# Patient Record
Sex: MALE | Race: OTHER
[De-identification: names, ages, dates, MRNs, and addresses within clinical notes are randomized per-mention and may not be internally consistent; named-entity substitution may affect disease eponyms.]

---

## 2018-08-02 ENCOUNTER — HOSPITAL ENCOUNTER (INPATIENT)
Dept: HOSPITAL 88 - OR | Age: 68
LOS: 5 days | Discharge: HOME | DRG: 665 | End: 2018-08-07
Attending: INTERNAL MEDICINE | Admitting: INTERNAL MEDICINE
Payer: MEDICARE

## 2018-08-02 VITALS — DIASTOLIC BLOOD PRESSURE: 78 MMHG | SYSTOLIC BLOOD PRESSURE: 123 MMHG

## 2018-08-02 VITALS — BODY MASS INDEX: 23.79 KG/M2 | HEIGHT: 66 IN | WEIGHT: 148.04 LBS

## 2018-08-02 VITALS — SYSTOLIC BLOOD PRESSURE: 112 MMHG | DIASTOLIC BLOOD PRESSURE: 80 MMHG

## 2018-08-02 VITALS — SYSTOLIC BLOOD PRESSURE: 135 MMHG | DIASTOLIC BLOOD PRESSURE: 78 MMHG

## 2018-08-02 DIAGNOSIS — C61: ICD-10-CM

## 2018-08-02 DIAGNOSIS — C79.51: ICD-10-CM

## 2018-08-02 DIAGNOSIS — D64.9: ICD-10-CM

## 2018-08-02 DIAGNOSIS — E78.5: ICD-10-CM

## 2018-08-02 DIAGNOSIS — N18.6: ICD-10-CM

## 2018-08-02 DIAGNOSIS — N17.9: ICD-10-CM

## 2018-08-02 DIAGNOSIS — I12.0: Primary | ICD-10-CM

## 2018-08-02 DIAGNOSIS — Z99.2: ICD-10-CM

## 2018-08-02 DIAGNOSIS — N13.30: ICD-10-CM

## 2018-08-02 DIAGNOSIS — E87.2: ICD-10-CM

## 2018-08-02 DIAGNOSIS — M17.10: ICD-10-CM

## 2018-08-02 DIAGNOSIS — R33.9: ICD-10-CM

## 2018-08-02 DIAGNOSIS — R35.1: ICD-10-CM

## 2018-08-02 LAB
ALBUMIN SERPL-MCNC: 3.6 G/DL (ref 3.5–5)
ALBUMIN/GLOB SERPL: 0.8 {RATIO} (ref 0.8–2)
ALP SERPL-CCNC: 168 IU/L (ref 40–150)
ALT SERPL-CCNC: 17 IU/L (ref 0–55)
ANION GAP SERPL CALC-SCNC: 20.9 MMOL/L (ref 8–16)
BASOPHILS # BLD AUTO: 0.1 10*3/UL (ref 0–0.1)
BASOPHILS NFR BLD AUTO: 0.9 % (ref 0–1)
BUN SERPL-MCNC: 65 MG/DL (ref 7–26)
BUN/CREAT SERPL: 13 (ref 6–25)
CALCIUM SERPL-MCNC: 9.6 MG/DL (ref 8.4–10.2)
CHLORIDE SERPL-SCNC: 104 MMOL/L (ref 98–107)
CHOLEST SERPL-MCNC: 207 MD/DL (ref 0–199)
CHOLEST/HDLC SERPL: 4.2 {RATIO} (ref 3.9–4.7)
CO2 SERPL-SCNC: 19 MMOL/L (ref 22–29)
DEPRECATED APTT PLAS QN: 33.7 SECONDS (ref 23.8–35.5)
DEPRECATED INR PLAS: 1.09
DEPRECATED NEUTROPHILS # BLD AUTO: 5.6 10*3/UL (ref 2.1–6.9)
EGFRCR SERPLBLD CKD-EPI 2021: 11 ML/MIN (ref 60–?)
EOSINOPHIL # BLD AUTO: 0.4 10*3/UL (ref 0–0.4)
EOSINOPHIL NFR BLD AUTO: 4.2 % (ref 0–6)
ERYTHROCYTE [DISTWIDTH] IN CORD BLOOD: 14.1 % (ref 11.7–14.4)
GLOBULIN PLAS-MCNC: 4.4 G/DL (ref 2.3–3.5)
GLUCOSE SERPLBLD-MCNC: 103 MG/DL (ref 74–118)
HCT VFR BLD AUTO: 29.8 % (ref 38.2–49.6)
HDLC SERPL-MSCNC: 49 MG/DL (ref 40–60)
HGB BLD-MCNC: 9.7 G/DL (ref 14–18)
LDLC SERPL CALC-MCNC: 132 MG/DL (ref 60–130)
LYMPHOCYTES # BLD: 2 10*3/UL (ref 1–3.2)
LYMPHOCYTES NFR BLD AUTO: 23.1 % (ref 18–39.1)
MCH RBC QN AUTO: 27.3 PG (ref 28–32)
MCHC RBC AUTO-ENTMCNC: 32.6 G/DL (ref 31–35)
MCV RBC AUTO: 83.9 FL (ref 81–99)
MONOCYTES # BLD AUTO: 0.6 10*3/UL (ref 0.2–0.8)
MONOCYTES NFR BLD AUTO: 6.8 % (ref 4.4–11.3)
NEUTS SEG NFR BLD AUTO: 64.7 % (ref 38.7–80)
PLATELET # BLD AUTO: 317 X10E3/UL (ref 140–360)
POTASSIUM SERPL-SCNC: 3.9 MMOL/L (ref 3.5–5.1)
PROTHROMBIN TIME: 13.3 SECONDS (ref 11.9–14.5)
RBC # BLD AUTO: 3.55 X10E6/UL (ref 4.3–5.7)
SODIUM SERPL-SCNC: 140 MMOL/L (ref 136–145)
TRIGL SERPL-MCNC: 130 MG/DL (ref 0–149)
TSH SERPL DL<=0.005 MIU/L-ACNC: 1.23 UIU/ML (ref 0.35–4.94)

## 2018-08-02 PROCEDURE — 86900 BLOOD TYPING SEROLOGIC ABO: CPT

## 2018-08-02 PROCEDURE — 80053 COMPREHEN METABOLIC PANEL: CPT

## 2018-08-02 PROCEDURE — 5A1D70Z PERFORMANCE OF URINARY FILTRATION, INTERMITTENT, LESS THAN 6 HOURS PER DAY: ICD-10-PCS

## 2018-08-02 PROCEDURE — 36415 COLL VENOUS BLD VENIPUNCTURE: CPT

## 2018-08-02 PROCEDURE — 86704 HEP B CORE ANTIBODY TOTAL: CPT

## 2018-08-02 PROCEDURE — 86920 COMPATIBILITY TEST SPIN: CPT

## 2018-08-02 PROCEDURE — 84443 ASSAY THYROID STIM HORMONE: CPT

## 2018-08-02 PROCEDURE — 85610 PROTHROMBIN TIME: CPT

## 2018-08-02 PROCEDURE — 71045 X-RAY EXAM CHEST 1 VIEW: CPT

## 2018-08-02 PROCEDURE — 93005 ELECTROCARDIOGRAM TRACING: CPT

## 2018-08-02 PROCEDURE — 86850 RBC ANTIBODY SCREEN: CPT

## 2018-08-02 PROCEDURE — 85025 COMPLETE CBC W/AUTO DIFF WBC: CPT

## 2018-08-02 PROCEDURE — 85730 THROMBOPLASTIN TIME PARTIAL: CPT

## 2018-08-02 PROCEDURE — 80061 LIPID PANEL: CPT

## 2018-08-02 PROCEDURE — 87340 HEPATITIS B SURFACE AG IA: CPT

## 2018-08-02 PROCEDURE — 88305 TISSUE EXAM BY PATHOLOGIST: CPT

## 2018-08-02 PROCEDURE — 86706 HEP B SURFACE ANTIBODY: CPT

## 2018-08-02 RX ADMIN — TAMSULOSIN HYDROCHLORIDE SCH MG: 0.4 CAPSULE ORAL at 21:00

## 2018-08-02 RX ADMIN — SODIUM CHLORIDE SCH GM: 9 INJECTION, SOLUTION INTRAVENOUS at 22:00

## 2018-08-02 NOTE — CONSULTATION
DATE OF CONSULTATION:  August 02, 2018 



HISTORY OF PRESENT ILLNESS:  Mr. Servando Recinos is known to me.  He is a 

67-year-old gentleman with underlying history of metastatic prostate cancer 

brought in by Dr. Stepan Hernandez for transurethral resection of prostate.  

He is dialysis dependent.  He is  currently awake and alert.  Denies fever, 

chills, chest pain, shortness of breath, nausea  or vomiting.



ALLERGIES:  NO APPARENT DRUG ALLERGIES.



CURRENT MEDICATIONS:  Patient is on Flomax 0.4 mg at bedtime, nifedipine 60 

mg daily.  Cefazolin 1 gram IV q.24 and IV fluid at 30 mL an hour.



SOCIAL HISTORY:  Patient is .  Does not smoke or drink.



FAMILY HISTORY:  Significant for hypertension.

 

PHYSICAL EXAMINATION:   

GENERAL:  Awake, alert and oriented times 3, lying supine in bed.

VITAL SIGNS:  With a blood pressure of 123/78, pulse rate 89, afebrile, 

respiratory rate 18.  

HEAD AND NECK:  Corneae clear.  Mucosa dry.  Neck veins flat. 

LUNGS:  Relatively clear.  No rales.

CARDIOVASCULAR:  S1 and S2  audible. 

ABDOMEN:  Otherwise soft. 

LOWER EXTREMITY EXAMINATION:  No edema. 



LABORATORY DATA:  Show a white count 8.6.  Hemoglobin 9.7.  Potassium 3.9.  

Bicarbonate 19.  Creatinine 5.18.  





IMPRESSION 

1. Metabolic acidosis. 

2. End-stage renal disease, dialysis dependent.  

3. Metastatic prostate cancer.  



Calcium level is normal at 9.6.  For TURP tomorrow.  Please see Dr. Hernandez's note.   







DD:  08/02/2018 18:07

DT:  08/02/2018 18:32

Job#:  [id number]

## 2018-08-02 NOTE — DIAGNOSTIC IMAGING REPORT
PROCEDURE:

A single AP view of the chest.

 

COMPARISON: None.

 

INDICATIONS:   PREOPERATIVE CHEST XRAY FOR PROSTATE SURGERY

     

FINDINGS:

Lines/tubes:  Left sided tunneled hemodialysis catheter with tip near 

the cavoatrial junction. Overlying clothing or dressing artifact.

 

Lungs:  Moderate lung volumes. There is no evidence of pneumonia or 

pulmonary edema. A 7 mm nodular opacity projects over the right lower 

lung zone. 

 

Pleura:  There is no pleural effusion or pneumothorax.

 

Heart and mediastinum:  The cardiomediastinal silhouette.

 

Bones:  No acute bony abnormality.

 

IMPRESSION: 

No evidence of pneumonia or pulmonary edema. 

 

A 7 mm nodular opacity projects over the right lower lung zone. There 

is artifact from overlying clothing or dressing in this location as 

well. Repeat chest radiograph is recommended with this removed to 

determine if the opacity is overlying the patient. If the opacity 

persists, a chest CT may be considered.

 

Dictated by:  PILLO RICH M.D. on 8/02/2018 at 14:57     

Electronically approved by:  PILLO RICH M.D. on 8/02/2018 at 14:57

## 2018-08-03 VITALS — DIASTOLIC BLOOD PRESSURE: 75 MMHG | SYSTOLIC BLOOD PRESSURE: 124 MMHG

## 2018-08-03 VITALS — DIASTOLIC BLOOD PRESSURE: 92 MMHG | SYSTOLIC BLOOD PRESSURE: 150 MMHG

## 2018-08-03 VITALS — DIASTOLIC BLOOD PRESSURE: 84 MMHG | SYSTOLIC BLOOD PRESSURE: 135 MMHG

## 2018-08-03 VITALS — SYSTOLIC BLOOD PRESSURE: 128 MMHG | DIASTOLIC BLOOD PRESSURE: 80 MMHG

## 2018-08-03 VITALS — SYSTOLIC BLOOD PRESSURE: 102 MMHG | DIASTOLIC BLOOD PRESSURE: 55 MMHG

## 2018-08-03 VITALS — DIASTOLIC BLOOD PRESSURE: 80 MMHG | SYSTOLIC BLOOD PRESSURE: 128 MMHG

## 2018-08-03 VITALS — SYSTOLIC BLOOD PRESSURE: 149 MMHG | DIASTOLIC BLOOD PRESSURE: 81 MMHG

## 2018-08-03 LAB
ALBUMIN SERPL-MCNC: 3.1 G/DL (ref 3.5–5)
ALBUMIN/GLOB SERPL: 0.8 {RATIO} (ref 0.8–2)
ALP SERPL-CCNC: 146 IU/L (ref 40–150)
ALT SERPL-CCNC: 17 IU/L (ref 0–55)
ANION GAP SERPL CALC-SCNC: 13.4 MMOL/L (ref 8–16)
BASOPHILS # BLD AUTO: 0.1 10*3/UL (ref 0–0.1)
BASOPHILS NFR BLD AUTO: 1.2 % (ref 0–1)
BUN SERPL-MCNC: 31 MG/DL (ref 7–26)
BUN/CREAT SERPL: 10 (ref 6–25)
CALCIUM SERPL-MCNC: 9.3 MG/DL (ref 8.4–10.2)
CHLORIDE SERPL-SCNC: 102 MMOL/L (ref 98–107)
CO2 SERPL-SCNC: 31 MMOL/L (ref 22–29)
DEPRECATED NEUTROPHILS # BLD AUTO: 3.4 10*3/UL (ref 2.1–6.9)
EGFRCR SERPLBLD CKD-EPI 2021: 20 ML/MIN (ref 60–?)
EOSINOPHIL # BLD AUTO: 0.3 10*3/UL (ref 0–0.4)
EOSINOPHIL NFR BLD AUTO: 4.4 % (ref 0–6)
ERYTHROCYTE [DISTWIDTH] IN CORD BLOOD: 13.9 % (ref 11.7–14.4)
GLOBULIN PLAS-MCNC: 3.7 G/DL (ref 2.3–3.5)
GLUCOSE SERPLBLD-MCNC: 102 MG/DL (ref 74–118)
HCT VFR BLD AUTO: 27.6 % (ref 38.2–49.6)
HGB BLD-MCNC: 8.8 G/DL (ref 14–18)
LYMPHOCYTES # BLD: 1.6 10*3/UL (ref 1–3.2)
LYMPHOCYTES NFR BLD AUTO: 27.1 % (ref 18–39.1)
MCH RBC QN AUTO: 27.4 PG (ref 28–32)
MCHC RBC AUTO-ENTMCNC: 31.9 G/DL (ref 31–35)
MCV RBC AUTO: 86 FL (ref 81–99)
MONOCYTES # BLD AUTO: 0.6 10*3/UL (ref 0.2–0.8)
MONOCYTES NFR BLD AUTO: 9.7 % (ref 4.4–11.3)
NEUTS SEG NFR BLD AUTO: 57.1 % (ref 38.7–80)
PLATELET # BLD AUTO: 268 X10E3/UL (ref 140–360)
POTASSIUM SERPL-SCNC: 4.4 MMOL/L (ref 3.5–5.1)
RBC # BLD AUTO: 3.21 X10E6/UL (ref 4.3–5.7)
SODIUM SERPL-SCNC: 142 MMOL/L (ref 136–145)

## 2018-08-03 PROCEDURE — 0VB08ZZ EXCISION OF PROSTATE, VIA NATURAL OR ARTIFICIAL OPENING ENDOSCOPIC: ICD-10-PCS | Performed by: UROLOGY

## 2018-08-03 RX ADMIN — TAMSULOSIN HYDROCHLORIDE SCH MG: 0.4 CAPSULE ORAL at 21:50

## 2018-08-03 RX ADMIN — SODIUM CHLORIDE SCH GM: 9 INJECTION, SOLUTION INTRAVENOUS at 21:50

## 2018-08-03 RX ADMIN — Medication SCH MG: at 08:55

## 2018-08-03 RX ADMIN — ACETAMINOPHEN AND CODEINE PHOSPHATE PRN EA: 300; 30 TABLET ORAL at 17:10

## 2018-08-03 NOTE — OPERATIVE REPORT
DATE OF PROCEDURE:    

 

ATTENDING PHYSICIAN:  Dr. MAMTA Roche.



PREOPERATIVE DIAGNOSES: 

1. Urinary retention.  

2. Adenocarcinoma of the prostate.  

3. Hydronephrosis.

4. Renal failure.



OPERATIONS PERFORMED:  

1. Cystoscopy.  

2. Transurethral resection of the prostate with a Plasma.



ASSISTANT:  None.



ANESTHESIA:  General.



CLINICAL INDICATION NOTE:  This is a 67-year-old patient found to be with 

advanced cancer of the prostate.  Had urinary retention.  Presently patient 

is on dialysis and is on LHRH agonist and _______.  Patient was brought for 

resection of the prostate.  This is not a treatment for the cancer, just to 

eliminate the obstruction.  Patient so advised as well as his wife, and 

they understand it. 



DESCRIPTION OF PROCEDURE AND FINDINGS:  After proper level of anesthesia 

was achieved, the patient was placed in lithotomy position, prepped and 

draped in sterile fashion.  Urethra inspected, is unremarkable.  The outlet 

is obstructed by large prostate.  Bladder is extensively trabeculated.  

Impossible to identify ureteral orifices.  The bladder has superficial 

diverticula as well.  The resectoscope was then inserted, and systematic 

resection of the prostate was done.  The veru could barely be identified, 

is very small.  Mid lobe was resected first and then both lateral lobes.  

Following the resection, a coagulation of any bleeding points was done.  A 

24-Jordanian 30-mL Mccarthy catheter was inserted, connected to continuous 

irrigation with normal saline.  Patient tolerated the procedure well and 

was transferred in satisfactory condition.  Blood loss minimal.  Specimen 

was sent to pathology.













DD:  08/03/2018 17:03

DT:  08/03/2018 17:17

Job#:  [id number] ALEX

## 2018-08-03 NOTE — HISTORY AND PHYSICAL
He is a 67-year-old male patient of mine who presented with renal failure, 

profound weakness and weight loss.  



CHIEF COMPLAINT:  The patient is being admitted for prostate cancer.  



HISTORY OF PRESENT ILLNESS:  Mr. Servando Recinos had metastatic prostate 

cancer and weight loss.  Had renal failure requiring hemodialysis.  Now, 

the patient is currently on hemodialysis treatment.  The patient is having 

metastatic prostate cancer with metastasis into the bones.  The patient has 

severe obstruction with hydronephrosis.  The patient is getting prostate 

surgery.  The patient was getting preop dialysis and monitoring.



MEDICAL HISTORY:  Hypertension, hyperlipidemia, arthritis.  



PAST SURGICAL HISTORY:  Hemodialysis catheter placement.



FAMILY HISTORY:  Hypertension.  



SOCIAL HISTORY:  Denies smoking.  Denies using alcohol.



ALLERGIES:  NO KNOWN DRUG ALLERGIES. 



The patient had a recent prostatic biopsy, which has metastasis to the 

bones.  The patient has severe anemia and renal failure.  



REVIEW OF SYSTEMS:  As per history of present illness. 



PHYSICAL EXAMINATION 

GENERAL:  He is a middle-aged male patient lying in bed not in any acute 

distress.

VITALS:  Temperature 98, pulse 80, respiratory rate 20, blood pressure 

110/70.

HEENT:  Normocephalic and atraumatic.  No jugular venous distention.  No 

lymphadenopathy.

LUNGS:  Bilateral equal air entry.  No rales.  No rhonchi.

HEART:  S1 and S2.  Regular.  No murmurs.  No gallops.

ABDOMEN:  Soft.  Bowel sounds are present.

NEUROLOGICAL:  No focal neurological deficit.

:  The patient has a Mccarthy catheter.  





DX --AC RENAL FAILURE

    MET PROSTATE CANCER 

      OA KNEE 





PLAN:  The patient will be admitted to the hospital.  The patient will be 

taken to the OR.  









DD:  08/03/2018 10:04

DT:  08/03/2018 10:12

Job#:  [id number] YAZMIN CHRISTIAN

## 2018-08-04 VITALS — DIASTOLIC BLOOD PRESSURE: 72 MMHG | SYSTOLIC BLOOD PRESSURE: 102 MMHG

## 2018-08-04 VITALS — DIASTOLIC BLOOD PRESSURE: 73 MMHG | SYSTOLIC BLOOD PRESSURE: 114 MMHG

## 2018-08-04 VITALS — DIASTOLIC BLOOD PRESSURE: 81 MMHG | SYSTOLIC BLOOD PRESSURE: 149 MMHG

## 2018-08-04 VITALS — DIASTOLIC BLOOD PRESSURE: 75 MMHG | SYSTOLIC BLOOD PRESSURE: 119 MMHG

## 2018-08-04 VITALS — SYSTOLIC BLOOD PRESSURE: 119 MMHG | DIASTOLIC BLOOD PRESSURE: 75 MMHG

## 2018-08-04 VITALS — SYSTOLIC BLOOD PRESSURE: 127 MMHG | DIASTOLIC BLOOD PRESSURE: 77 MMHG

## 2018-08-04 VITALS — SYSTOLIC BLOOD PRESSURE: 125 MMHG | DIASTOLIC BLOOD PRESSURE: 77 MMHG

## 2018-08-04 VITALS — DIASTOLIC BLOOD PRESSURE: 75 MMHG | SYSTOLIC BLOOD PRESSURE: 121 MMHG

## 2018-08-04 LAB
BASOPHILS # BLD AUTO: 0 10*3/UL (ref 0–0.1)
BASOPHILS NFR BLD AUTO: 0.1 % (ref 0–1)
DEPRECATED NEUTROPHILS # BLD AUTO: 8.1 10*3/UL (ref 2.1–6.9)
EOSINOPHIL # BLD AUTO: 0 10*3/UL (ref 0–0.4)
EOSINOPHIL NFR BLD AUTO: 0.2 % (ref 0–6)
ERYTHROCYTE [DISTWIDTH] IN CORD BLOOD: 14.1 % (ref 11.7–14.4)
HCT VFR BLD AUTO: 27.1 % (ref 38.2–49.6)
HGB BLD-MCNC: 8.5 G/DL (ref 14–18)
LYMPHOCYTES # BLD: 1.2 10*3/UL (ref 1–3.2)
LYMPHOCYTES NFR BLD AUTO: 12.4 % (ref 18–39.1)
MCH RBC QN AUTO: 27.1 PG (ref 28–32)
MCHC RBC AUTO-ENTMCNC: 31.4 G/DL (ref 31–35)
MCV RBC AUTO: 86.3 FL (ref 81–99)
MONOCYTES # BLD AUTO: 0.7 10*3/UL (ref 0.2–0.8)
MONOCYTES NFR BLD AUTO: 6.6 % (ref 4.4–11.3)
NEUTS SEG NFR BLD AUTO: 80.3 % (ref 38.7–80)
PLATELET # BLD AUTO: 250 X10E3/UL (ref 140–360)
RBC # BLD AUTO: 3.14 X10E6/UL (ref 4.3–5.7)

## 2018-08-04 PROCEDURE — 5A1D70Z PERFORMANCE OF URINARY FILTRATION, INTERMITTENT, LESS THAN 6 HOURS PER DAY: ICD-10-PCS

## 2018-08-04 RX ADMIN — ACETAMINOPHEN AND CODEINE PHOSPHATE PRN EA: 300; 30 TABLET ORAL at 02:49

## 2018-08-04 RX ADMIN — SODIUM CHLORIDE SCH GM: 9 INJECTION, SOLUTION INTRAVENOUS at 20:50

## 2018-08-04 RX ADMIN — TAMSULOSIN HYDROCHLORIDE SCH MG: 0.4 CAPSULE ORAL at 20:50

## 2018-08-04 RX ADMIN — Medication SCH MG: at 09:00

## 2018-08-04 NOTE — PROGRESS NOTE
DATE:    



NEPHROLOGY DIALYSIS PROGRESS NOTE



SUBJECTIVE:  Mr. Recinos has poor appetite. He denied any shortness of 

breath. He is losing weight. He has mild pain in the pelvis.



PHYSICAL FINDINGS:  

GENERAL:  Alert, oriented, in no acute distress.

VITAL SIGNS:  Blood pressure 121/75, heart rate 71 per minute.

LUNGS:  Bilaterally clear to auscultation.

HEART:  Normal heart sounds. No additional sounds.

ABDOMEN:  Soft, nontender. No organomegaly. 

EXTREMITIES:  No cyanosis, clubbing or edema.



LABORATORY FINDINGS:  Noted and reviewed.



ASSESSMENT AND PLAN:  End-stage renal disease. The patient is receiving 

hemodialysis per orders, ultrafiltration as tolerated. 











DD:  08/04/2018 16:54

DT:  08/04/2018 17:19

Job#:  [id number]

## 2018-08-05 VITALS — SYSTOLIC BLOOD PRESSURE: 122 MMHG | DIASTOLIC BLOOD PRESSURE: 79 MMHG

## 2018-08-05 VITALS — SYSTOLIC BLOOD PRESSURE: 115 MMHG | DIASTOLIC BLOOD PRESSURE: 58 MMHG

## 2018-08-05 VITALS — DIASTOLIC BLOOD PRESSURE: 79 MMHG | SYSTOLIC BLOOD PRESSURE: 122 MMHG

## 2018-08-05 VITALS — SYSTOLIC BLOOD PRESSURE: 114 MMHG | DIASTOLIC BLOOD PRESSURE: 80 MMHG

## 2018-08-05 VITALS — DIASTOLIC BLOOD PRESSURE: 79 MMHG | SYSTOLIC BLOOD PRESSURE: 114 MMHG

## 2018-08-05 VITALS — DIASTOLIC BLOOD PRESSURE: 68 MMHG | SYSTOLIC BLOOD PRESSURE: 115 MMHG

## 2018-08-05 VITALS — SYSTOLIC BLOOD PRESSURE: 127 MMHG | DIASTOLIC BLOOD PRESSURE: 78 MMHG

## 2018-08-05 RX ADMIN — SODIUM CHLORIDE SCH GM: 9 INJECTION, SOLUTION INTRAVENOUS at 21:23

## 2018-08-05 RX ADMIN — Medication SCH MG: at 08:43

## 2018-08-05 RX ADMIN — TAMSULOSIN HYDROCHLORIDE SCH MG: 0.4 CAPSULE ORAL at 21:23

## 2018-08-06 VITALS — DIASTOLIC BLOOD PRESSURE: 82 MMHG | SYSTOLIC BLOOD PRESSURE: 134 MMHG

## 2018-08-06 VITALS — SYSTOLIC BLOOD PRESSURE: 109 MMHG | DIASTOLIC BLOOD PRESSURE: 79 MMHG

## 2018-08-06 VITALS — DIASTOLIC BLOOD PRESSURE: 83 MMHG | SYSTOLIC BLOOD PRESSURE: 126 MMHG

## 2018-08-06 VITALS — DIASTOLIC BLOOD PRESSURE: 67 MMHG | SYSTOLIC BLOOD PRESSURE: 121 MMHG

## 2018-08-06 VITALS — DIASTOLIC BLOOD PRESSURE: 80 MMHG | SYSTOLIC BLOOD PRESSURE: 112 MMHG

## 2018-08-06 VITALS — DIASTOLIC BLOOD PRESSURE: 81 MMHG | SYSTOLIC BLOOD PRESSURE: 115 MMHG

## 2018-08-06 RX ADMIN — SODIUM CHLORIDE SCH GM: 9 INJECTION, SOLUTION INTRAVENOUS at 21:41

## 2018-08-06 RX ADMIN — TAMSULOSIN HYDROCHLORIDE SCH MG: 0.4 CAPSULE ORAL at 21:41

## 2018-08-06 RX ADMIN — ACETAMINOPHEN AND CODEINE PHOSPHATE PRN EA: 300; 30 TABLET ORAL at 19:16

## 2018-08-06 RX ADMIN — Medication SCH MG: at 09:16

## 2018-08-06 RX ADMIN — Medication SCH MG: at 14:31

## 2018-08-06 RX ADMIN — Medication SCH MG: at 18:11

## 2018-08-07 VITALS — DIASTOLIC BLOOD PRESSURE: 80 MMHG | SYSTOLIC BLOOD PRESSURE: 127 MMHG

## 2018-08-07 VITALS — SYSTOLIC BLOOD PRESSURE: 99 MMHG | DIASTOLIC BLOOD PRESSURE: 76 MMHG

## 2018-08-07 VITALS — SYSTOLIC BLOOD PRESSURE: 110 MMHG | DIASTOLIC BLOOD PRESSURE: 67 MMHG

## 2018-08-07 VITALS — SYSTOLIC BLOOD PRESSURE: 109 MMHG | DIASTOLIC BLOOD PRESSURE: 73 MMHG

## 2018-08-07 VITALS — DIASTOLIC BLOOD PRESSURE: 80 MMHG | SYSTOLIC BLOOD PRESSURE: 124 MMHG

## 2018-08-07 PROCEDURE — 5A1D70Z PERFORMANCE OF URINARY FILTRATION, INTERMITTENT, LESS THAN 6 HOURS PER DAY: ICD-10-PCS

## 2018-08-07 RX ADMIN — Medication SCH MG: at 09:00

## 2018-08-07 RX ADMIN — Medication SCH MG: at 13:00

## 2018-08-07 RX ADMIN — Medication SCH MG: at 09:40

## 2018-08-07 NOTE — DISCHARGE SUMMARY
This 67-year-old patient of mine presented to the hospital for renal 

failure and profound weakness.  The patient was undergoing surgery.  The 

patient was admitted with a diagnosis of renal failure with hydronephrosis 

secondary to obstructive uropathy from the metastatic prostate cancer and 

osteoarthritis and hypertension.  



HOSPITAL COURSE SUMMARY:   The patient was admitted with the above 

diagnosis.  The patient had emergent hemodialysis treatment before the 

surgery.  Then after that the patient was taken to the operating room and 

treated by Dr. Hernandez.  The patient had prostatectomy done.  Cystoscopy 

also was done.  TURP was done.  Postop the patient was having hematuria so 

he was given continuous bladder irrigation.  The patient's residual urine 

was obtained.  The patient was given postop analgesia.  Now, upon 

stabilization the patient will be discharged home on oral antibiotic, 

Bactrim single strength and Tylenol No. 3 for pain.  Would recommend the 

patient ______ for arthritic pain.  The patient was advised to follow up 

within the next week as an outpatient.  The patient is supposed to avoid 

any NSAIDs immediate postop.  The patient will continue to get hemodialysis 

treatment as an outpatient with nephrology group.  The patient will be 

followed up as an outpatient.  

 



 _________________________________

HONORIO MCCOY MD



DD:  08/07/2018 18:05

DT:  08/07/2018 18:36

Job#:  [id number]

## 2024-11-18 ENCOUNTER — HOSPITAL ENCOUNTER (EMERGENCY)
Dept: HOSPITAL 88 - ER | Age: 74
Discharge: HOME | End: 2024-11-18
Payer: MEDICARE

## 2024-11-18 VITALS
DIASTOLIC BLOOD PRESSURE: 79 MMHG | TEMPERATURE: 97.3 F | HEART RATE: 68 BPM | SYSTOLIC BLOOD PRESSURE: 148 MMHG | OXYGEN SATURATION: 100 % | RESPIRATION RATE: 18 BRPM

## 2024-11-18 VITALS — TEMPERATURE: 98.1 F

## 2024-11-18 VITALS — HEIGHT: 65 IN | BODY MASS INDEX: 22.66 KG/M2 | WEIGHT: 136 LBS

## 2024-11-18 VITALS — HEART RATE: 63 BPM

## 2024-11-18 DIAGNOSIS — I25.2: ICD-10-CM

## 2024-11-18 DIAGNOSIS — I12.0: ICD-10-CM

## 2024-11-18 DIAGNOSIS — I25.10: ICD-10-CM

## 2024-11-18 DIAGNOSIS — Z99.2: ICD-10-CM

## 2024-11-18 DIAGNOSIS — Z85.46: ICD-10-CM

## 2024-11-18 DIAGNOSIS — I16.0: Primary | ICD-10-CM

## 2024-11-18 DIAGNOSIS — R94.31: ICD-10-CM

## 2024-11-18 DIAGNOSIS — E78.5: ICD-10-CM

## 2024-11-18 DIAGNOSIS — N18.6: ICD-10-CM

## 2024-11-18 LAB
ALBUMIN SERPL-MCNC: 3.8 G/DL (ref 3.5–5)
ALBUMIN/GLOB SERPL: 1.3 {RATIO} (ref 0.8–2)
ALP SERPL-CCNC: 86 IU/L (ref 40–150)
ALT SERPL-CCNC: 7 IU/L (ref 0–55)
ANION GAP SERPL CALC-SCNC: 18.7 MMOL/L (ref 8–16)
BASOPHILS # BLD AUTO: 0.1 10*3/UL (ref 0–0.1)
BASOPHILS NFR BLD AUTO: 0.5 % (ref 0–1)
BILIRUB SERPL-MCNC: 0.6 MG/DL (ref 0.2–1.2)
BUN SERPL-MCNC: 72 MG/DL (ref 7–26)
BUN/CREAT SERPL: 10 (ref 6–25)
CALCIUM SERPL-MCNC: 9.2 MG/DL (ref 8.4–10.2)
CHLORIDE SERPL-SCNC: 107 MMOL/L (ref 98–107)
CO2 SERPL-SCNC: 19 MMOL/L (ref 22–29)
DEPRECATED NEUTROPHILS # BLD AUTO: 7.9 10*3/UL (ref 2.1–6.9)
EGFRCR SERPLBLD CKD-EPI 2021: 7 ML/MIN (ref 60–?)
EOSINOPHIL # BLD AUTO: 0.1 10*3/UL (ref 0–0.4)
EOSINOPHIL NFR BLD AUTO: 1.3 % (ref 0–6)
ERYTHROCYTE [DISTWIDTH] IN CORD BLOOD: 14.7 % (ref 11.7–14.4)
GLOBULIN PLAS-MCNC: 3 G/DL (ref 2.3–3.5)
GLUCOSE SERPLBLD-MCNC: 106 MG/DL (ref 74–118)
HCT VFR BLD AUTO: 31.7 % (ref 38.2–49.6)
HGB BLD-MCNC: 9.9 G/DL (ref 14–18)
LYMPHOCYTES # BLD: 1.1 10*3/UL (ref 1–3.2)
LYMPHOCYTES NFR BLD AUTO: 10.9 % (ref 18–39.1)
MCH RBC QN AUTO: 28.5 PG (ref 28–32)
MCHC RBC AUTO-ENTMCNC: 31.2 G/DL (ref 31–35)
MCV RBC AUTO: 91.4 FL (ref 81–99)
MONOCYTES # BLD AUTO: 0.7 10*3/UL (ref 0.2–0.8)
MONOCYTES NFR BLD AUTO: 6.8 % (ref 4.4–11.3)
NEUTS SEG NFR BLD AUTO: 80.2 % (ref 38.7–80)
PLATELET # BLD AUTO: 128 X10E3/UL (ref 140–360)
POTASSIUM SERPL-SCNC: 4.7 MMOL/L (ref 3.5–5.1)
PROT SERPL-MCNC: 6.8 G/DL (ref 6.5–8.1)
RBC # BLD AUTO: 3.47 X10E6/UL (ref 4.3–5.7)
SODIUM SERPL-SCNC: 140 MMOL/L (ref 136–145)
TROPONIN I SERPL DL<=0.01 NG/ML-MCNC: 0.02 NG/ML (ref 0–0.3)
WBC # BLD: 9.82 X10E3/UL (ref 4.8–10.8)

## 2024-11-18 PROCEDURE — 83880 ASSAY OF NATRIURETIC PEPTIDE: CPT

## 2024-11-18 PROCEDURE — 99284 EMERGENCY DEPT VISIT MOD MDM: CPT

## 2024-11-18 PROCEDURE — 84484 ASSAY OF TROPONIN QUANT: CPT

## 2024-11-18 PROCEDURE — 93005 ELECTROCARDIOGRAM TRACING: CPT

## 2024-11-18 PROCEDURE — 36415 COLL VENOUS BLD VENIPUNCTURE: CPT

## 2024-11-18 PROCEDURE — 85025 COMPLETE CBC W/AUTO DIFF WBC: CPT

## 2024-11-18 PROCEDURE — 94760 N-INVAS EAR/PLS OXIMETRY 1: CPT

## 2024-11-18 PROCEDURE — 80053 COMPREHEN METABOLIC PANEL: CPT

## 2024-11-18 PROCEDURE — 71046 X-RAY EXAM CHEST 2 VIEWS: CPT

## 2024-11-18 RX ADMIN — HYDRALAZINE HYDROCHLORIDE STA MG: 20 INJECTION INTRAMUSCULAR; INTRAVENOUS at 06:48
